# Patient Record
Sex: MALE | Race: BLACK OR AFRICAN AMERICAN | Employment: UNEMPLOYED | URBAN - METROPOLITAN AREA
[De-identification: names, ages, dates, MRNs, and addresses within clinical notes are randomized per-mention and may not be internally consistent; named-entity substitution may affect disease eponyms.]

---

## 2021-07-16 ENCOUNTER — APPOINTMENT (OUTPATIENT)
Dept: CT IMAGING | Facility: HOSPITAL | Age: 46
End: 2021-07-16
Attending: EMERGENCY MEDICINE
Payer: MEDICAID

## 2021-07-16 PROCEDURE — 71260 CT THORAX DX C+: CPT | Performed by: EMERGENCY MEDICINE

## 2021-07-16 PROCEDURE — 72125 CT NECK SPINE W/O DYE: CPT | Performed by: EMERGENCY MEDICINE

## 2021-07-16 PROCEDURE — 74177 CT ABD & PELVIS W/CONTRAST: CPT | Performed by: EMERGENCY MEDICINE

## 2021-07-16 PROCEDURE — 70450 CT HEAD/BRAIN W/O DYE: CPT | Performed by: EMERGENCY MEDICINE

## 2021-07-16 NOTE — ED QUICK NOTES
Clothing given to pt to go home in since his clothing was cut off upon arrival to er. Loan Nogueira arranged per . Ambulated pt to waiting room to wait for medicar. Steady gait and clear speech noted.

## 2021-07-16 NOTE — ED PROVIDER NOTES
Patient Seen in: [facility] AND [facility] Emergency Department    History   Patient presents with:  Fall: ETOH    Stated Complaint: ETOH fall      HPI    Makenna Cuff Genao with unknown PMH presenting via EMS for evaluation after being noted to be intoxicated with reports extremities equally, fine/further neurologic examination limited by clinical condition/cooperation intoxication. Skin: Skin is warm. Psychiatric: Uncooperative/agitated, yelling multiple expletives and physically/verbally threatening staff.   Nursing not [facility]    NAME: Barrera Coe OF EXAM: 07/16/2021  Patient No:  [id number]  Physician:  Nain Brennan  YOB: 1905    Past Medical History (entered by Technologist):    Reason For Exam (entered by Technologist):  ETOH, FALL  Other original report and destroy any copies or printouts.   ---------------------------------------------------------------------------------------------  ADDENDUM:  Chest:  No pneumothorax or pleural effusion  Mild bibasilar dependent lung groundglass, likely a discharge. 0544: Resting without distress, still difficult to arouse after calming medication - await sobriety for reassessment/disposition.   0624: Patient able to provide name/ and without complaints - stable for discharge with patient attempting to

## 2021-07-16 NOTE — ED INITIAL ASSESSMENT (HPI)
Pt arrived by ambulance in 4 point restraints. Per EMS, pt fell downs stairs while intoxicated. Pt aggressive physically and verbally towards staff. Restraints started. Unable to provide history, only comments \"I'm going to leonel you all\". Will reassess.

## 2021-07-16 NOTE — CM/SW NOTE
Spoke with patient regarding transportation home post discharge from ER. Patient asked [facility] where he was in relation to [facility] and if [address] was paying to get him a taxi home. Patient stated he had nobody to come and pick him up.   EDCM stated that